# Patient Record
Sex: FEMALE | Race: WHITE | NOT HISPANIC OR LATINO | Employment: FULL TIME | URBAN - METROPOLITAN AREA
[De-identification: names, ages, dates, MRNs, and addresses within clinical notes are randomized per-mention and may not be internally consistent; named-entity substitution may affect disease eponyms.]

---

## 2023-01-01 ENCOUNTER — APPOINTMENT (EMERGENCY)
Dept: RADIOLOGY | Facility: HOSPITAL | Age: 48
End: 2023-01-01

## 2023-01-01 ENCOUNTER — HOSPITAL ENCOUNTER (EMERGENCY)
Facility: HOSPITAL | Age: 48
Discharge: HOME | End: 2023-01-01
Attending: EMERGENCY MEDICINE

## 2023-01-01 ENCOUNTER — APPOINTMENT (EMERGENCY)
Dept: CT IMAGING | Facility: HOSPITAL | Age: 48
End: 2023-01-01

## 2023-01-01 VITALS
RESPIRATION RATE: 18 BRPM | SYSTOLIC BLOOD PRESSURE: 150 MMHG | TEMPERATURE: 97.2 F | DIASTOLIC BLOOD PRESSURE: 85 MMHG | OXYGEN SATURATION: 98 % | HEART RATE: 60 BPM

## 2023-01-01 DIAGNOSIS — S42.032A DISPLACED FRACTURE OF LATERAL END OF LEFT CLAVICLE, INITIAL ENCOUNTER FOR CLOSED FRACTURE: ICD-10-CM
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
## 2023-01-01 DIAGNOSIS — S09.90XA CLOSED HEAD INJURY, INITIAL ENCOUNTER: ICD-10-CM

## 2023-01-01 NOTE — Clinical Note
Shaka Abdullahi was seen and treated in our emergency department on 1/1/2023  Diagnosis:     China Alvares  may return to work on return date  She may return on this date: 01/06/2023         If you have any questions or concerns, please don't hesitate to call        Marcy Saavedra PA-C    ______________________________           _______________          _______________  Hospital Representative                              Date                                Time

## 2023-01-01 NOTE — ED PROVIDER NOTES
History  Chief Complaint   Patient presents with   • Fall     Pt was at work taking out the trash when she fell forward falling and hitting left side of head and left shoulder  Denies loc denies thinners  Small laceration noted to left side of head  Pt c/o left shoulder pain  C-collar placed by ems as precaution  Patient is a 48-year-old female with no significant past medical history presenting to the emergency department for evaluation after a fall  She states that she was taking the trash out at work when she slipped and fell  She landed on the left side of her head as well as her left shoulder  She has a small lump to the left side of her head as well as a small abrasion  She is currently reporting left-sided shoulder pain  Denying any headache, blurry vision, focal weakness, numbness, tingling, facial droop, dysarthria, confusion  Only complaint currently is left shoulder pain  No chest pain, difficulty breathing, abdominal pain, nausea, vomiting, diarrhea  No loss of consciousness  She does not take anticoagulation  No other complaints at this time  None       History reviewed  No pertinent past medical history  History reviewed  No pertinent surgical history  History reviewed  No pertinent family history  I have reviewed and agree with the history as documented  E-Cigarette/Vaping     E-Cigarette/Vaping Substances     Social History     Tobacco Use   • Smoking status: Every Day     Types: Cigarettes   • Smokeless tobacco: Never       Review of Systems   Constitutional: Negative for chills and fever  HENT: Negative for congestion, facial swelling, nosebleeds, sore throat and voice change  Eyes: Negative for pain and redness  Respiratory: Negative for cough, choking, chest tightness, shortness of breath and stridor  Cardiovascular: Negative for chest pain and palpitations  Gastrointestinal: Negative for abdominal pain, diarrhea, nausea and vomiting  Musculoskeletal: Positive for arthralgias  Negative for back pain, myalgias, neck pain and neck stiffness  Skin: Positive for wound  Negative for color change and rash  Neurological: Negative for dizziness, syncope, facial asymmetry, weakness, light-headedness, numbness and headaches  Psychiatric/Behavioral: Negative for confusion and suicidal ideas  All other systems reviewed and are negative  Physical Exam  Physical Exam  Vitals reviewed  Constitutional:       General: She is not in acute distress  Appearance: Normal appearance  She is not ill-appearing, toxic-appearing or diaphoretic  HENT:      Head: Normocephalic  Abrasion (small abrasion to the L side of the forehead) and contusion (small contusion to the L side of the forehead) present  Right Ear: External ear normal       Left Ear: External ear normal       Nose: Nose normal  No congestion or rhinorrhea  Mouth/Throat:      Mouth: Mucous membranes are moist       Pharynx: Oropharynx is clear  No oropharyngeal exudate or posterior oropharyngeal erythema  Eyes:      General: No scleral icterus  Right eye: No discharge  Left eye: No discharge  Extraocular Movements: Extraocular movements intact  Conjunctiva/sclera: Conjunctivae normal    Cardiovascular:      Rate and Rhythm: Normal rate and regular rhythm  Pulses: Normal pulses  Heart sounds: Normal heart sounds  No murmur heard  No friction rub  No gallop  Pulmonary:      Effort: Pulmonary effort is normal  No respiratory distress  Breath sounds: Normal breath sounds  No stridor  No wheezing, rhonchi or rales  Musculoskeletal:      Left shoulder: Tenderness (L AC joint) present  Normal range of motion  Cervical back: Normal range of motion and neck supple  Right lower leg: No edema  Left lower leg: No edema  Skin:     General: Skin is warm and dry  Capillary Refill: Capillary refill takes less than 2 seconds  Neurological:      General: No focal deficit present  Mental Status: She is alert and oriented to person, place, and time  Psychiatric:         Mood and Affect: Mood normal          Behavior: Behavior normal          Vital Signs  ED Triage Vitals [01/01/23 0251]   Temperature Pulse Respirations Blood Pressure SpO2   (!) 97 2 °F (36 2 °C) 62 18 150/85 98 %      Temp Source Heart Rate Source Patient Position - Orthostatic VS BP Location FiO2 (%)   Tympanic Monitor Sitting Right arm --      Pain Score       --           Vitals:    01/01/23 0251 01/01/23 0300   BP: 150/85 150/85   Pulse: 62 60   Patient Position - Orthostatic VS: Sitting Lying         Visual Acuity  Visual Acuity    Flowsheet Row Most Recent Value   L Pupil Size (mm) 3   R Pupil Size (mm) 3          ED Medications  Medications - No data to display    Diagnostic Studies  Results Reviewed     None                 CT head without contrast   Final Result by Sy Cabrera MD (01/01 [id number])      No acute intracranial abnormality  Workstation performed: [id number]         XR shoulder 2+ views LEFT   Final Result by Kaitlynn Melgar MD ([id number])      Acute displaced distal left clavicle fracture      Workstation performed: [id number]                    Procedures  Procedures         ED Course                                             Medical Decision Making  Patient presenting for evaluation of fall from standing while taking out the trash at work  She did hit her head, however denies loss of consciousness  She is having left-sided shoulder pain  Upon arrival, she appears comfortable  She is not in any acute distress  Vital signs are unremarkable  She does have a small contusion to the left side of her head with an overlying abrasion  Bleeding is controlled  She has tenderness over the distal end of her left clavicle as well as the left AC joint  CT head unremarkable    X-ray of the left shoulder revealed a distal clavicle fracture  Patient denies analgesia at this time  She was placed in a sling  She was given instructions to follow-up with orthopedics  Strict return precautions were discussed  She is in stable condition at time of discharge  Closed head injury, initial encounter: acute illness or injury  Displaced fracture of lateral end of left clavicle, initial encounter for closed fracture: acute illness or injury  Fall from standing, initial encounter: acute illness or injury  Amount and/or Complexity of Data Reviewed  Radiology: ordered and independent interpretation performed  Details: Mildly displaced distal clavicle fracture  Disposition  Final diagnoses:   Fall from standing, initial encounter   Closed head injury, initial encounter   Displaced fracture of lateral end of left clavicle, initial encounter for closed fracture     Time reflects when diagnosis was documented in both MDM as applicable and the Disposition within this note     Time User Action Codes Description Comment    1/1/2023  4:30 AM Tam Himanshu Add [D21  XXXA] Fall from standing, initial encounter     1/1/2023  4:30 AM Tam Himanshu Add [S09 90XA] Closed head injury, initial encounter     1/1/2023  4:30 AM Tam Himanshu Add [J30 358D] Displaced fracture of lateral end of left clavicle, initial encounter for closed fracture       ED Disposition     ED Disposition   Discharge    Condition   Stable    Date/Time   Sun Jan 1, 2023  4:30 AM    Comment   Theodor Peabody discharge to home/self care                 Follow-up Information     Follow up With Specialties Details Why Contact Info Additional [address] [facility] [address] Emergency Department Emergency Medicine Go to  If symptoms worsen [address] [facility] [address] [facility] Emergency Department, [address] [place], [place], [address] [facility] [address] [facility] Schedule an appointment as soon as possible for a visit  For follow up [address] [facility]  [place] [address] [phone]  [address], [address] [facility] [address] [address] [place], [place], [address] [facility]          There are no discharge medications for this patient  No discharge procedures on file      PDMP Review     None          ED Provider  Electronically Signed by           Marcy Saavedra PA-C  01/02/23 4055

## 2023-01-03 ENCOUNTER — OFFICE VISIT (OUTPATIENT)
Dept: OBGYN CLINIC | Facility: CLINIC | Age: 48
End: 2023-01-03

## 2023-01-03 VITALS
HEART RATE: 60 BPM | DIASTOLIC BLOOD PRESSURE: 83 MMHG | BODY MASS INDEX: 22.73 KG/M2 | HEIGHT: 68 IN | WEIGHT: 150 LBS | SYSTOLIC BLOOD PRESSURE: 128 MMHG

## 2023-01-03 DIAGNOSIS — S42.032A CLOSED DISPLACED FRACTURE OF ACROMIAL END OF LEFT CLAVICLE, INITIAL ENCOUNTER: Primary | ICD-10-CM

## 2023-01-03 RX ORDER — ACETAMINOPHEN 325 MG/1
650 TABLET ORAL EVERY 6 HOURS PRN
COMMUNITY

## 2023-01-03 NOTE — PROGRESS NOTES
Assessment/Plan:  Assessment/Plan   Diagnoses and all orders for this visit:    Closed displaced fracture of acromial end of left clavicle, initial encounter  -     Ambulatory Referral to Physical Therapy; Future    Other orders  -     acetaminophen (TYLENOL) 325 mg tablet; Take 650 mg by mouth every 6 (six) hours as needed for mild pain       49year-old left hand dominant female with onset left shoulder pain from injury at work on 01/01/2023  Discussed with patient physical exam, imaging studies, impression and plan  CT scan of the head is unremarkable for acute intracranial abnormality  Physical exam of the head noted for abrasion at the left frontal aspect with ecchymosis left sided head and at the left   Periorbital region  Left shoulder noted for ecchymosis at the anterior aspect  She has mild tenderness at the distal clavicle and AC joint  She has range of motion limited to forward flexion 30, abduction 30  She has   4/5 strength abduction, internal and external rotation due to pain  She has normal sensation, biceps reflex, and radial pulse both upper extremities  Clinical impression is that she is symptomatic from acute fracture of the clavicle  I discussed treatment in the form of resting,  stabilization, and gentle range of motion  Surgery is not warranted  She is to continue with arm sling to limited movement of the arm  She is to start formal therapy in 2 weeks with passive range of motion modalities to limit stiffness  She is to take vitamin-D 2000 International Units daily  She may alternate between Tylenol and ibuprofen as needed and may also do icing  She will follow up in 3 weeks at which point we will repeat x-rays left clavicle and she will be re-evaluated  Subjective:   Patient ID: Theodor Peabody is a 49 y o  female    Chief Complaint   Patient presents with   • Left Shoulder - Pain       76-year-old left hand dominant female presents for evaluation left shoulder pain from fall injury at work on 01/01/2023  She was taking out the trash when she tripped and fell landing onto her left side injuring her left shoulder and left side of the head  She had pain of left shoulder described as sudden in onset, generalized to the shoulder, non radiating, severe intensity, worse with moving the arm, associated with limited range of motion, and improved resting  She was taken to emergency room where x-ray evaluation was noted for fracture of the distal clavicle  She has CT of the head done which were unremarkable for acute intracranial abnormality  She was placed in arm sling and advised to follow up with orthopedic care  She has been taking Tylenol to help with pain  Shoulder Pain  This is a new problem  The current episode started in the past 7 days  The problem occurs daily  The problem has been unchanged  Associated symptoms include arthralgias and weakness  Pertinent negatives include no abdominal pain, chest pain, chills, fever, joint swelling, numbness, rash or sore throat  Exacerbated by:   Arm movement  She has tried rest, immobilization, acetaminophen and ice for the symptoms  The treatment provided mild relief  The following portions of the patient's history were reviewed and updated as appropriate: She  has no past medical history on file  She  has no past surgical history on file  Her family history is not on file  She  reports that she has been smoking cigarettes  She has never used smokeless tobacco  No history on file for alcohol use and drug use  She has No Known Allergies       Review of Systems   Constitutional: Negative for chills and fever  HENT: Negative for sore throat  Respiratory: Negative for shortness of breath  Cardiovascular: Negative for chest pain  Gastrointestinal: Negative for abdominal pain  Genitourinary: Negative for flank pain  Musculoskeletal: Positive for arthralgias  Negative for joint swelling     Skin: Negative for rash and wound  Neurological: Positive for weakness  Negative for numbness  Hematological: Does not bruise/bleed easily  Psychiatric/Behavioral: Negative for self-injury  Objective:  Vitals:    01/03/23 1527   BP: 128/83   Pulse: 60   Weight: 68 kg (150 lb)   Height: 5' 7 5" (1 715 m)     Right Hand Exam     Muscle Strength   The patient has normal right wrist strength  Other   Sensation: normal  Pulse: present      Left Hand Exam     Muscle Strength   The patient has normal left wrist strength  Other   Sensation: normal  Pulse: present      Right Elbow Exam     Other   Sensation: normal      Left Elbow Exam     Tenderness   The patient is experiencing tenderness in the lateral epicondyle  Range of Motion   The patient has normal left elbow ROM  Muscle Strength   The patient has normal left elbow strength ( 5/5 flexion and extension)  Other   Sensation: normal      Left Shoulder Exam     Tenderness   The patient is experiencing tenderness in the acromion and acromioclavicular joint ( distal clavicle, anterior, trapezius, lateral)  Range of Motion   Active abduction: 30   Forward flexion: 30     Muscle Strength   Abduction: 4/5   Internal rotation: 4/5   External rotation: 4/5     Other   Sensation: normal     Comments:   Ecchymosis anterior shoulder          Strength/Myotome Testing     Left Wrist/Hand   Normal wrist strength    Right Wrist/Hand   Normal wrist strength      Physical Exam  Vitals and nursing note reviewed  Constitutional:       General: She is not in acute distress  Appearance: She is well-developed  She is not ill-appearing or diaphoretic  HENT:      Head: Normocephalic  Comments:  Left frontal ecchymosis     Right Ear: External ear normal       Left Ear: External ear normal    Eyes:      Conjunctiva/sclera: Conjunctivae normal       Comments:  Left periorbital ecchymosis   Neck:      Trachea: No tracheal deviation     Cardiovascular:      Rate and Rhythm: Normal rate  Pulmonary:      Effort: Pulmonary effort is normal  No respiratory distress  Abdominal:      General: There is no distension  Musculoskeletal:         General: Swelling, tenderness and signs of injury present  No deformity  Skin:     General: Skin is warm and dry  Coloration: Skin is not jaundiced or pale  Neurological:      Mental Status: She is alert and oriented to person, place, and time  Psychiatric:         Mood and Affect: Mood normal          Behavior: Behavior normal          Thought Content: Thought content normal          Judgment: Judgment normal          I have personally reviewed pertinent films in PACS and my interpretation is  mild displaced distal clavicle fracture

## 2023-01-03 NOTE — LETTER
January 3, 2023     Patient: Aakash Peter  YOB: 1975  Date of Visit: 1/3/2023      To Whom it May Concern:    Aakash Peter is under my professional care  Casey Manning was seen in my office on 1/3/2023  Casey Manning is unable to return to work at this time time  She will be re-evaluated in 3 weeks  If you have any questions or concerns, please don't hesitate to call           Sincerely,          Cleo Rodriguez DO        CC: No Recipients

## 2023-01-24 ENCOUNTER — APPOINTMENT (OUTPATIENT)
Dept: RADIOLOGY | Facility: CLINIC | Age: 48
End: 2023-01-24

## 2023-01-24 ENCOUNTER — OFFICE VISIT (OUTPATIENT)
Dept: OBGYN CLINIC | Facility: CLINIC | Age: 48
End: 2023-01-24

## 2023-01-24 VITALS
WEIGHT: 143 LBS | SYSTOLIC BLOOD PRESSURE: 113 MMHG | DIASTOLIC BLOOD PRESSURE: 77 MMHG | HEIGHT: 68 IN | BODY MASS INDEX: 21.67 KG/M2 | HEART RATE: 58 BPM

## 2023-01-24 DIAGNOSIS — S42.032D CLOSED DISPLACED FRACTURE OF ACROMIAL END OF LEFT CLAVICLE WITH ROUTINE HEALING, SUBSEQUENT ENCOUNTER: Primary | ICD-10-CM

## 2023-01-24 DIAGNOSIS — S42.032A CLOSED DISPLACED FRACTURE OF ACROMIAL END OF LEFT CLAVICLE, INITIAL ENCOUNTER: ICD-10-CM

## 2023-01-24 DIAGNOSIS — M25.612 SHOULDER STIFFNESS, LEFT: ICD-10-CM

## 2023-01-24 NOTE — LETTER
January 24, 2023     Patient: Krissy Cooper  YOB: 1975  Date of Visit: 1/24/2023      To Whom it May Concern:    Krissy Cooper is under my professional care  Konrad Polk was seen in my office on 1/24/2023  Konrad Polk may work with restrictions:  -No lifting, pushing, pulling more than 10 pounds in left upper extremity      She will be re-evaluated in 4 weeks  If you have any questions or concerns, please don't hesitate to call           Sincerely,          Cleo Rodriguez DO        CC: No Recipients

## 2023-01-24 NOTE — PROGRESS NOTES
Assessment/Plan:  Assessment/Plan   Diagnoses and all orders for this visit:    Closed displaced fracture of acromial end of left clavicle with routine healing, subsequent encounter  -     XR clavicle left; Future  -     Ambulatory Referral to Physical Therapy; Future    Shoulder stiffness, left  -     Ambulatory Referral to Physical Therapy; Future         58-year-old left hand dominant female with onset of left shoulder pain from injury at work on 01/01/2023  Discussed with patient physical exam, radiographs, impression and plan  X-rays noted for persistence distal clavicle fracture without change in alignment  Left shoulder noted for range of motion forward flexion 90, abduction 90, and internal rotation to the lower thoracic spine  She has normal strength in rotator cuff  Clinical impression is that she is improving  Recommend she continue with formal therapy and doing home exercises  She will follow up in 4 weeks at which point we will repeat x-rays left clavicle and she will be re-evaluated  Subjective:   Patient ID: Delgado Singletary is a 49 y o  female  Chief Complaint   Patient presents with   • Left Shoulder - Fracture, Follow-up         58-year-old left hand dominant female following for onset of left shoulder pain from injury at work on 01/01/2023  She was last seen by me 3 weeks ago which point she was advised on resting from activity and to start formal therapy  She was advised to start  Vitamin-D supplementation  She reports feeling better since her last visit  She has started therapy has been doing home exercises  She reports having pain described as generalized to the shoulder, achy and sore, nonradiating, worse with movement particularly elevating above shoulder level, associated with limited range of motion, and improved  With resting  Shoulder Pain  This is a new problem  The current episode started 1 to 4 weeks ago  The problem occurs daily   The problem has been gradually improving  Associated symptoms include arthralgias  Pertinent negatives include no joint swelling, numbness or weakness  Exacerbated by:  arm elevation  She has tried rest ( physical therapy, home exercise) for the symptoms  The treatment provided mild relief  Review of Systems   Musculoskeletal: Positive for arthralgias  Negative for joint swelling  Neurological: Negative for weakness and numbness  Objective:  Vitals:    01/24/23 1111   BP: 113/77   Pulse: 58   Weight: 64 9 kg (143 lb)   Height: 5' 7 5" (1 715 m)     Left Shoulder Exam     Range of Motion   Active abduction: 90   Forward flexion: 90   Left shoulder internal rotation 0 degrees:  lower thoracic  Muscle Strength   Abduction: 5/5   Internal rotation: 5/5   External rotation: 5/5   Supraspinatus: 5/5             Physical Exam  Vitals and nursing note reviewed  Constitutional:       General: She is not in acute distress  Appearance: She is well-developed  She is not ill-appearing or diaphoretic  HENT:      Head: Normocephalic  Right Ear: External ear normal       Left Ear: External ear normal    Eyes:      Conjunctiva/sclera: Conjunctivae normal    Neck:      Trachea: No tracheal deviation  Cardiovascular:      Rate and Rhythm: Normal rate  Pulmonary:      Effort: Pulmonary effort is normal  No respiratory distress  Abdominal:      General: There is no distension  Skin:     General: Skin is warm and dry  Coloration: Skin is not jaundiced or pale  Neurological:      Mental Status: She is alert and oriented to person, place, and time  Psychiatric:         Mood and Affect: Mood normal          Behavior: Behavior normal          Thought Content: Thought content normal          Judgment: Judgment normal          I have personally reviewed pertinent films in PACS and my interpretation is  X-rays noted for persistence distal clavicle fracture without change in alignment  Ashley Santo

## 2023-02-21 ENCOUNTER — OFFICE VISIT (OUTPATIENT)
Dept: OBGYN CLINIC | Facility: CLINIC | Age: 48
End: 2023-02-21

## 2023-02-21 ENCOUNTER — APPOINTMENT (OUTPATIENT)
Dept: RADIOLOGY | Facility: CLINIC | Age: 48
End: 2023-02-21

## 2023-02-21 VITALS
DIASTOLIC BLOOD PRESSURE: 85 MMHG | BODY MASS INDEX: 22.88 KG/M2 | SYSTOLIC BLOOD PRESSURE: 127 MMHG | HEIGHT: 68 IN | WEIGHT: 151 LBS | HEART RATE: 55 BPM

## 2023-02-21 DIAGNOSIS — S42.032D CLOSED DISPLACED FRACTURE OF ACROMIAL END OF LEFT CLAVICLE WITH ROUTINE HEALING, SUBSEQUENT ENCOUNTER: Primary | ICD-10-CM

## 2023-02-21 DIAGNOSIS — S42.032D CLOSED DISPLACED FRACTURE OF ACROMIAL END OF LEFT CLAVICLE WITH ROUTINE HEALING, SUBSEQUENT ENCOUNTER: ICD-10-CM

## 2023-02-21 NOTE — PROGRESS NOTES
Assessment/Plan:  Assessment/Plan   Diagnoses and all orders for this visit:    Closed displaced fracture of acromial end of left clavicle with routine healing, subsequent encounter  -     XR clavicle left; Future        21-year-old left-hand-dominant female who sustained fracture of left clavicle while at work 1/1/2023  Discussed with patient physical exam, radiographs, impression, and plan  X-rays left shoulder for progressive healing of distal clavicle fracture without change in alignment  Left shoulder noted for mild tenderness of the distal clavicle  She range of motion forward flexion to 170 degrees, abduction 160 degrees, and internal rotation to the lumbar spine  She has normal strength in rotator cuff  Empty can testing and Se[place]ron Hoose are unremarkable  There is mild pain with AC joint and clavicle provocation testing  She demonstrates push-up  Clinical impression is that she is recovering well from her injury  Recommend she increase her level of physical activity over the course of the next few weeks  She will follow-up as needed  Subjective:   Patient ID: Valerio Guaman is a 49 y o  female  Chief Complaint   Patient presents with   • Left Shoulder - Follow-up, Fracture       21-year-old left-hand-dominant female following up for left clavicle fracture sustained from injury while at work on 1/1/2023  She was last seen by me weeks ago at which point she was advised to continue with formal therapy  She reports feeling better since her last visit  She reports having pain described as localized to the superior aspect of the shoulder, achy and sore, mild intense, none radiating, worse with laying on her side for prolonged duration, worse with heavy lifting above her level, and improved with resting  She reports being able to reach above her level with weighted object such moving plates from a shelf  Her new job is less physically demanding  Shoulder Pain  This is a new problem   The current episode started more than 1 month ago  The problem occurs daily  The problem has been gradually improving  Associated symptoms include arthralgias  Pertinent negatives include no joint swelling, numbness or weakness  Exacerbated by: Physical activity  She has tried rest (Physical therapy, home exercise) for the symptoms  The treatment provided moderate relief  Review of Systems   Musculoskeletal: Positive for arthralgias  Negative for joint swelling  Neurological: Negative for weakness and numbness  Objective:  Vitals:    02/21/23 1625   BP: 127/85   Pulse: 55   Weight: 68 5 kg (151 lb)   Height: 5' 7 5" (1 715 m)     Left Shoulder Exam     Tenderness   The patient is experiencing tenderness in the clavicle  Range of Motion   Active abduction: 160   Forward flexion: 170   Internal rotation 0 degrees: Lumbar     Muscle Strength   Abduction: 5/5   Internal rotation: 5/5   External rotation: 5/5   Supraspinatus: 5/5     Tests   Feliz test: negative    Comments:  Negative empty can test  Mild pain with resisted cross arm abduction            Physical Exam  Vitals and nursing note reviewed  Constitutional:       General: She is not in acute distress  Appearance: She is well-developed  She is not ill-appearing or diaphoretic  HENT:      Head: Normocephalic  Right Ear: External ear normal       Left Ear: External ear normal    Eyes:      Conjunctiva/sclera: Conjunctivae normal    Neck:      Trachea: No tracheal deviation  Cardiovascular:      Rate and Rhythm: Normal rate  Pulmonary:      Effort: Pulmonary effort is normal  No respiratory distress  Abdominal:      General: There is no distension  Musculoskeletal:         General: Tenderness present  No swelling, deformity or signs of injury  Skin:     General: Skin is warm and dry  Coloration: Skin is not jaundiced or pale  Neurological:      Mental Status: She is alert and oriented to person, place, and time  Psychiatric:         Mood and Affect: Mood normal          Behavior: Behavior normal          Thought Content: Thought content normal          Judgment: Judgment normal          I have personally reviewed pertinent films in PACS and my interpretation is X-rays left shoulder for progressive healing of distal clavicle fracture without change in alignment  [place]

## 2023-02-21 NOTE — LETTER
February 21, 2023     Patient: Martha Kovacs  YOB: 1975  Date of Visit: 2/21/2023      To Whom it May Concern:    Martha Kovacs is under my professional care  José Manuel Leonard was seen in my office on 2/21/2023  José Manuel Leonard may work with restrictions until 03/07/2023: No lifting, pushing, pulling more than 30 pounds  As of 03/07/2023 she may return to work without restriction  If you have any questions or concerns, please don't hesitate to call           Sincerely,          Cleo Rodriguez DO        CC: No Recipients

## 2025-05-21 ENCOUNTER — OFFICE VISIT (OUTPATIENT)
Age: 50
End: 2025-05-21

## 2025-05-21 VITALS — BODY MASS INDEX: 24.38 KG/M2 | TEMPERATURE: 97.6 F | WEIGHT: 158 LBS

## 2025-05-21 DIAGNOSIS — D48.5 NEOPLASM OF UNCERTAIN BEHAVIOR OF SKIN: Primary | ICD-10-CM

## 2025-05-21 PROCEDURE — 88305 TISSUE EXAM BY PATHOLOGIST: CPT | Performed by: STUDENT IN AN ORGANIZED HEALTH CARE EDUCATION/TRAINING PROGRAM

## 2025-05-21 PROCEDURE — 88342 IMHCHEM/IMCYTCHM 1ST ANTB: CPT | Performed by: STUDENT IN AN ORGANIZED HEALTH CARE EDUCATION/TRAINING PROGRAM

## 2025-05-21 NOTE — PROGRESS NOTES
"[facility] Note     Patient Name: Jennifer Walker  Encounter Date: 5/21/25       Have you been cared for by a [facility] Dermatologist in the last 3 years and, if so, which description applies to you? NO. I am considered a \"new\" patient and must complete all patient intake questions. I am of child-bearing potential.     REVIEW OF SYSTEMS:  Have you recently had or currently have any of the following? Recent fever or chills? No  Any non-healing wound? No  Are you pregnant or planning to become pregnant? No  Are you currently or planning to be nursing or breast feeding? No   PAST MEDICAL HISTORY:  Have you personally ever had or currently have any of the following?  If \"YES,\" then please provide more detail. Skin cancer (such as Melanoma, Basal Cell Carcinoma, Squamous Cell Carcinoma?  No  Tuberculosis, HIV/AIDS, Hepatitis B or C: No  Radiation Treatment No   HISTORY OF IMMUNOSUPPRESSION:   Do you have a history of any of the following:  Systemic Immunosuppression such as Diabetes, Biologic or Immunotherapy, Chemotherapy, Organ Transplantation, Bone Marrow Transplantation or Prednsione?  No    Answering \"YES\" requires the addition of the dotphrase \"IMMUNOSUPPRESSED\" as the first diagnosis of the patient's visit.   FAMILY HISTORY:  Any \"first degree relatives\" (parent, brother, sister, or child) with the following?    Skin Cancer, Pancreatic or Other Cancer? No   PATIENT EXPERIENCE:    Do you want the Dermatologist to perform a COMPLETE skin exam today including a clinical examination under the \"bra and underwear\" areas?  NO  If necessary, do we have your permission to call and leave a detailed message on your Preferred Phone number that includes your specific medical information?  Yes      Allergies[1] Current Medications[2]        Whom besides the patient is providing clinical information about today's encounter?   NO ADDITIONAL HISTORIAN (patient alone provided history)    Physical Exam and " "Assessment/Plan by Diagnosis:      NEOPLASM OF UNCERTAIN BEHAVIOR OF SKIN    Physical Exam:  (Anatomic Location); (Size and Morphological Description); (Differential Diagnosis):  A: left cheek; 0.5 cm pink papule; BCC vs. IDN r/o atypia  Pertinent Positives:  Pertinent Negatives:        Additional History of Present Condition:  Patient presents for a spot of concern that has been present since the past month. She states that it has become darker in color. She states that it feels raised. She reports that she may have had cryotherapy treatment done to this area about 5 years ago but not sure what it was for.     Assessment and Plan:  I have discussed with the patient that a sample of skin via a \"skin biopsy” would be potentially helpful to further make a specific diagnosis under the microscope.  Based on a thorough discussion of this condition and the management approach to it (including a comprehensive discussion of the known risks, side effects and potential benefits of treatment), the patient (family) agrees to implement the following specific plan:    Procedure:  Skin Biopsy.  After a thorough discussion of treatment options and risk/benefits/alternatives (including but not limited to local pain, scarring, dyspigmentation, blistering, possible superinfection, and inability to confirm a diagnosis via histopathology), verbal and written consent were obtained and portion of the rash was biopsied for tissue sample.  See below for consent that was obtained from patient and subsequent Procedure Note.     PROCEDURE TANGENTIAL (SHAVE) BIOPSY NOTE:    Performing Physician: Dr. Toure  Anatomic Location; Clinical Description with size (cm); Pre-Op Diagnosis:   A: left cheek; 0.5 cm pink papule; BCC vs. IDN r/o atypia  Post-op diagnosis: Same     Local anesthesia: 1% xylocaine with epi      Topical anesthesia: None    Hemostasis: Aluminum chloride     After obtaining informed consent  at which time there was a " "discussion about the purpose of biopsy  and low risks of infection and bleeding.  The area was prepped and draped in the usual fashion. Anesthesia was obtained with 1% lidocaine with epinephrine. A shave biopsy to an appropriate sampling depth was obtained by Shave (Dermablade or 15 blade) The resulting wound was covered with surgical ointment and bandaged appropriately.     The patient tolerated the procedure well without complications and was without signs of functional compromise.      Specimen has been sent for review by Dermatopathology.    Standard post-procedure care has been explained and has been included in written form within the patient's copy of Informed Consent.    INFORMED CONSENT DISCUSSION AND POST-OPERATIVE INSTRUCTIONS FOR PATIENT    I.  RATIONALE FOR PROCEDURE  I understand that a skin biopsy allows the Dermatologist to test a lesion or rash under the microscope to obtain a diagnosis.  It usually involves numbing the area with numbing medication and removing a small piece of skin; sometimes the area will be closed with sutures. In this specific procedure, sutures are not usually needed.  If any sutures are placed, then they are usually need to be removed in 2 weeks or less.    I understand that my Dermatologist recommends that a skin \"shave\" biopsy be performed today.  A local anesthetic, similar to the kind that a dentist uses when filling a cavity, will be injected with a very small needle into the skin area to be sampled.  The injected skin and tissue underneath \"will go to sleep” and become numb so no pain should be felt afterwards.  An instrument shaped like a tiny \"razor blade\" (shave biopsy instrument) will be used to cut a small piece of tissue and skin from the area so that a sample of tissue can be taken and examined more closely under the microscope.  A slight amount of bleeding will occur, but it will be stopped with direct pressure and a pressure bandage and any other appropriate " "methods.  I understands that a scar will form where the wound was created.  Surgical ointment will be applied to help protect the wound.  Sutures are not usually needed.    II.  RISKS AND POTENTIAL COMPLICATIONS   I understand the risks and potential complications of a skin biopsy include but are not limited to the following:  Bleeding  Infection  Pain  Scar/keloid  Skin discoloration  Incomplete Removal  Recurrence  Nerve Damage/Numbness/Loss of Function  Allergic Reaction to Anesthesia  Biopsies are diagnostic procedures and based on findings additional treatment or evaluation may be required  Loss or destruction of specimen resulting in no additional findings    My Dermatologist has explained to me the nature of the condition, the nature of the procedure, and the benefits to be reasonably expected compared with alternative approaches.  My Dermatologist has discussed the likelihood of major risks or complications of this procedure including the specific risks listed above, such as bleeding, infection, and scarring/keloid.  I understand that a scar is expected after this procedure.  I understand that my physician cannot predict if the scar will form a \"keloid,\" which extends beyond the borders of the wound that is created.  A keloid is a thick, painful, and bumpy scar.  A keloid can be difficult to treat, as it does not always respond well to therapy, which includes injecting cortisone directly into the keloid every few weeks.  While this usually reduces the pain and size of the scar, it does not eliminate it.      I understand that photographs may be taken before and after the procedure.  These will be maintained as part of the medical providers confidential records and may not be made available to me.  I further authorize the medical provider to use the photographs for teaching purposes or to illustrate scientific papers, books, or lectures if in his/her judgment, medical research, education, or science may " "benefit from its use.    I have had an opportunity to fully inquire about the risks and benefits of this procedure and its alternatives.   I have been given ample time and opportunity to ask questions and to seek a second opinion if I wished to do so.  I acknowledge that there have specifically been no guarantees as to the cosmetic results from the procedure.  I am aware that with any procedure there is always the possibility of an unexpected complication.    III. POST-PROCEDURAL CARE (WHAT YOU WILL NEED TO DO \"AFTER THE BIOPSY\" TO OPTIMIZE HEALING)    Keep the area clean and dry.  Try NOT to remove the bandage or get it wet for the first 24 hours.    Gently clean the area and apply surgical ointment (such as Vaseline petrolatum ointment, which is available \"over the counter\" and not a prescription) to the biopsy site for up to 2 weeks straight.  This acts to protect the wound from the outside world.      Sutures are not usually placed in this procedure.  If any sutures were placed, return for suture removal as instructed (generally 1 week for the face, 2 weeks for the body).      Take Acetaminophen (Tylenol) for discomfort, if no contraindications.  Ibuprofen or aspirin could make bleeding worse.    Call our office immediately for signs of infection: fever, chills, increased redness, warmth, tenderness, discomfort/pain, or pus or foul smell coming from the wound.    WHAT TO DO IF THERE IS ANY BLEEDING?  If a small amount of bleeding is noticed, place a clean cloth over the area and apply firm pressure for ten minutes.  Check the wound after 10 minutes of direct pressure.  If bleeding persists, try one more time for an additional 10 minutes of direct pressure on the area.  If the bleeding becomes heavier or does not stop after the second attempt, or if you have any other questions about this procedure, then please call your . [facility] by calling [phone] (SKIN).     I hereby acknowledge that I have " reviewed and verified the site with my Dermatologist and have requested and authorized my Dermatologist to proceed with the procedure.        Scribe Attestation      I,:  Shereen Eckert MA am acting as a scribe while in the presence of the attending physician.:       I,:  Lavelle Toure MD personally performed the services described in this documentation    as scribed in my presence.:                [1] No Known Allergies  [2]   Current Outpatient Medications:     acetaminophen (TYLENOL) 325 mg tablet, Take 650 mg by mouth every 6 (six) hours as needed for mild pain (Patient not taking: Reported on 2/21/2023), Disp: , Rfl:

## 2025-05-27 PROCEDURE — 88342 IMHCHEM/IMCYTCHM 1ST ANTB: CPT | Performed by: STUDENT IN AN ORGANIZED HEALTH CARE EDUCATION/TRAINING PROGRAM

## 2025-05-27 PROCEDURE — 88305 TISSUE EXAM BY PATHOLOGIST: CPT | Performed by: STUDENT IN AN ORGANIZED HEALTH CARE EDUCATION/TRAINING PROGRAM

## 2025-05-28 ENCOUNTER — RESULTS FOLLOW-UP (OUTPATIENT)
Age: 50
End: 2025-05-28

## 2025-07-16 ENCOUNTER — HOSPITAL ENCOUNTER (EMERGENCY)
Facility: HOSPITAL | Age: 50
Discharge: HOME | End: 2025-07-16
Attending: EMERGENCY MEDICINE
Payer: COMMERCIAL

## 2025-07-16 VITALS
WEIGHT: 157.85 LBS | HEART RATE: 75 BPM | SYSTOLIC BLOOD PRESSURE: 145 MMHG | BODY MASS INDEX: 23.92 KG/M2 | HEIGHT: 68 IN | TEMPERATURE: 98.2 F | DIASTOLIC BLOOD PRESSURE: 78 MMHG | OXYGEN SATURATION: 100 % | RESPIRATION RATE: 18 BRPM

## 2025-07-16 DIAGNOSIS — A69.29 LYME DERMATITIS: Primary | ICD-10-CM

## 2025-07-16 DIAGNOSIS — H57.11 EYE PAIN, RIGHT: ICD-10-CM

## 2025-07-16 DIAGNOSIS — L30.8 LYME DERMATITIS: Primary | ICD-10-CM

## 2025-07-16 PROCEDURE — 99283 EMERGENCY DEPT VISIT LOW MDM: CPT

## 2025-07-16 PROCEDURE — 86617 LYME DISEASE ANTIBODY: CPT | Performed by: EMERGENCY MEDICINE

## 2025-07-16 PROCEDURE — 36415 COLL VENOUS BLD VENIPUNCTURE: CPT | Performed by: EMERGENCY MEDICINE

## 2025-07-16 PROCEDURE — 99284 EMERGENCY DEPT VISIT MOD MDM: CPT | Performed by: EMERGENCY MEDICINE

## 2025-07-16 PROCEDURE — 86618 LYME DISEASE ANTIBODY: CPT | Performed by: EMERGENCY MEDICINE

## 2025-07-16 PROCEDURE — 87469 BABESIA MICROTI AMP PRB: CPT | Performed by: EMERGENCY MEDICINE

## 2025-07-16 PROCEDURE — 87468 ANAPLSMA PHGCYTOPHLM AMP PRB: CPT | Performed by: EMERGENCY MEDICINE

## 2025-07-16 PROCEDURE — 87478 BORRELIA MIYAMOTOI AMP PRB: CPT | Performed by: EMERGENCY MEDICINE

## 2025-07-16 PROCEDURE — 87484 EHRLICHA CHAFFEENSIS AMP PRB: CPT | Performed by: EMERGENCY MEDICINE

## 2025-07-16 RX ORDER — FAMCICLOVIR 500 MG/1
500 TABLET ORAL 3 TIMES DAILY
Qty: 21 TABLET | Refills: 0 | Status: SHIPPED | OUTPATIENT
Start: 2025-07-16 | End: 2025-07-16

## 2025-07-16 RX ORDER — DOXYCYCLINE 100 MG/1
100 CAPSULE ORAL ONCE
Status: COMPLETED | OUTPATIENT
Start: 2025-07-16 | End: 2025-07-16

## 2025-07-16 RX ORDER — DOXYCYCLINE HYCLATE 100 MG
100 TABLET ORAL 2 TIMES DAILY
Qty: 42 TABLET | Refills: 0 | Status: SHIPPED | OUTPATIENT
Start: 2025-07-16 | End: 2025-07-17

## 2025-07-16 RX ORDER — TOBRAMYCIN 3 MG/ML
1 SOLUTION/ DROPS OPHTHALMIC ONCE
Status: COMPLETED | OUTPATIENT
Start: 2025-07-16 | End: 2025-07-16

## 2025-07-16 RX ORDER — TETRACAINE HYDROCHLORIDE 5 MG/ML
1 SOLUTION OPHTHALMIC ONCE
Status: DISCONTINUED | OUTPATIENT
Start: 2025-07-16 | End: 2025-07-16

## 2025-07-16 RX ORDER — ACYCLOVIR 200 MG/1
800 CAPSULE ORAL ONCE
Status: DISCONTINUED | OUTPATIENT
Start: 2025-07-16 | End: 2025-07-16 | Stop reason: HOSPADM

## 2025-07-16 RX ADMIN — FLUORESCEIN SODIUM 1 STRIP: 1 STRIP OPHTHALMIC at 18:22

## 2025-07-16 RX ADMIN — DOXYCYCLINE HYCLATE 100 MG: 100 CAPSULE ORAL at 19:01

## 2025-07-16 RX ADMIN — TOBRAMYCIN 1 DROP: 3 SOLUTION/ DROPS OPHTHALMIC at 18:22

## 2025-07-16 NOTE — DISCHARGE INSTRUCTIONS
Doxycycline twice daily to fight infection  Follow-up with [facility], you can go to the office tomorrow and they will see you.  Return worsening symptoms or any problems

## 2025-07-16 NOTE — ED PROVIDER NOTES
Time reflects when diagnosis was documented in both MDM as applicable and the Disposition within this note       Time User Action Codes Description Comment    7/16/2025  6:22 PM Andrea Tang Add [H57.11] Eye pain, right     7/16/2025  6:22 PM Andrea Tang Add [B02.9] Shingles     7/16/2025  6:29 PM Andrea Tang Modify [B02.9] Shingles     7/16/2025  6:29 PM Andrea Tang Remove [H57.11] Eye pain, right     7/16/2025  6:29 PM Andrea Tang Remove [B02.9] Shingles     7/16/2025  6:29 PM Andrea Tang Add [A69.29,  L30.8] Lyme dermatitis     7/16/2025  6:30 PM Andrea Tang Add [H57.11] Eye pain, right           ED Disposition       ED Disposition   Discharge    Condition   Stable    Date/Time   Wed Jul 16, 2025  6:22 PM    Comment   Jennifer Walker discharge to home/self care.                   Assessment & Plan       Medical Decision Making  Amount and/or Complexity of Data Reviewed  Labs: ordered.    Risk  Prescription drug management.      Medical decision making 50-year-old female  presents to the emergency department recently had a rash treated with Valtrex for what was presumed to be shingles.  Now reports the rash was improving.  Initially the patient did not want me to look at the rash and I evaluated her eyes but complained of more diffuse systemic aches and pains so I evaluated her rash and it appears to be the rest of Lyme's.  There is a large red circular patch greater than 5 cm with central clearing.  Patient has pictures of her phone the initial rash did show some pustular like lesions 3-4 and a red patch but now the rash appears to be more classic for erythema migrans.  Discussed treatment with doxycycline with the patient.  Discussed with her for persistent eye complaints I would advise her to follow-up with ophthalmology but her vision is normal tonight.  There is no sign of dendrites on her cornea and no sign of involvement of shingles on her eye.  I do not believe the patient had shingles at  all I believe she is has erythema migrans and should treated be treated for Lyme's.  Patient agreed and understood.       Medications   fluorescein sodium sterile ophthalmic strip 1 strip (1 strip Both Eyes Given 7/16/25 1822)   tobramycin (TOBREX) 0.3 % ophthalmic solution 1 drop (1 drop Right Eye Given 7/16/25 1822)   doxycycline hyclate (VIBRAMYCIN) capsule 100 mg (100 mg Oral Given 7/16/25 1901)       ED Risk Strat Scores                    No data recorded        SBIRT 20yo+      Flowsheet Row Most Recent Value   Initial Alcohol Screen: US AUDIT-C     1. How often do you have a drink containing alcohol? 0 Filed at: 07/16/2025 1849   2. How many drinks containing alcohol do you have on a typical day you are drinking?  0 Filed at: 07/16/2025 1849   3b. FEMALE Any Age, or MALE 65+: How often do you have 4 or more drinks on one occassion? 0 Filed at: 07/16/2025 1849   Audit-C Score 0 Filed at: 07/16/2025 1849   ELO: How many times in the past year have you...    Used an illegal drug or used a prescription medication for non-medical reasons? Never Filed at: 07/16/2025 1849                            History of Present Illness       Chief Complaint   Patient presents with    Eye Pain     Pt states R eye pain starting on Sunday, states having shingles last week and unsure if its related or not        Past Medical History[1]   Past Surgical History[2]   Family History[3]   Social History[4]   E-Cigarette/Vaping      E-Cigarette/Vaping Substances      I have reviewed and agree with the history as documented.     HPI patient is a 50-year-old female seen recently at as an outpatient for a rash that started on her left thigh.  Patient has pictures on her phone's of a circular rash with some small vesicular lesions appear more pustular to me on her phone.  She reports she was told this was probably early shingles.  She was treated with valacyclovir twice daily for this and reports some improvement.  She reports the rash  seems to be improved but now developed some irritation of her eyes.  She reports irritation primarily on the lateral aspect of her right eye and some dryness.  She was concerned that the shingles virus had gotten into her eyes.  She reports some generalized headache and eye throbbing which is primarily bilateral.  She reports some soreness and neck stiffness.  She reports some diffuse bodyaches.  She denies any fever.  She denies any shaking chills.  Patient denies any trauma.  She denies any visual disturbance.  Visual acuity is normal by our evaluation 20/20.  Denies any trauma.  Review of Systems   Constitutional:  Negative for fever.   HENT:  Negative for congestion.    Eyes:  Positive for pain and redness.   Respiratory:  Negative for cough and shortness of breath.    Cardiovascular:  Negative for chest pain.   Gastrointestinal:  Negative for abdominal pain and vomiting.   Skin:  Positive for rash.           Objective       ED Triage Vitals [07/16/25 1705]   Temperature Pulse Blood Pressure Respirations SpO2 Patient Position - Orthostatic VS   98.2 °F (36.8 °C) 75 145/78 18 100 % --      Temp Source Heart Rate Source BP Location FiO2 (%) Pain Score    Temporal -- Left arm -- --      Vitals      Date and Time Temp Pulse SpO2 Resp BP Pain Score FACES Pain Rating User   07/16/25 1705 98.2 °F (36.8 °C) 75 100 % 18 145/78 -- -- MR            Physical Exam  Vitals and nursing note reviewed.   Constitutional:       Appearance: She is well-developed.   HENT:      Head: Normocephalic.      Right Ear: External ear normal.      Left Ear: External ear normal.      Nose: Nose normal.      Mouth/Throat:      Mouth: Mucous membranes are moist.      Pharynx: Oropharynx is clear.     Eyes:      General: Lids are normal.      Extraocular Movements: Extraocular movements intact.      Conjunctiva/sclera: Conjunctivae normal.      Pupils: Pupils are equal, round, and reactive to light.      Comments: No uptake with fluorescein,  pupils equal and reactive, normal funduscopic exam.  Normal anterior chamber, normal pressure to palpation     Cardiovascular:      Rate and Rhythm: Normal rate and regular rhythm.   Pulmonary:      Effort: Pulmonary effort is normal. No respiratory distress.     Musculoskeletal:         General: No deformity. Normal range of motion.      Cervical back: Normal range of motion and neck supple.     Skin:     General: Skin is warm and dry.      Comments: Patient has a red inflamed rash on her left anterior thigh with central clearing consistent with erythema migrans.  No drainable abscess.     Neurological:      Mental Status: She is alert and oriented to person, place, and time.     Psychiatric:         Mood and Affect: Mood normal.         Results Reviewed       Procedure Component Value Units Date/Time    LYME TOTAL AB W REFLEX TO IGM/IGG [[id number]] Collected: 07/16/25 1857    Lab Status: In process Specimen: Blood from Arm, Right Updated: 07/16/25 1905    Narrative:      The following orders were created for panel order LYME TOTAL AB W REFLEX TO IGM/IGG.  Procedure                               Abnormality         Status                     ---------                               -----------         ------                     Lyme Total AB W Reflex t...[[id number]]                      In process                   Please view results for these tests on the individual orders.    Lyme Total AB W Reflex to IGM/IGG [[id number]] Collected: 07/16/25 1857    Lab Status: In process Specimen: Blood from Arm, Right Updated: 07/16/25 1905    Tick-borne Disease, Acute Molecular Panel, Non-Lyme [[id number]] Collected: 07/16/25 1857    Lab Status: In process Specimen: Blood from Arm, Right Updated: 07/16/25 1905            No orders to display       Procedures    ED Medication and Procedure Management   Prior to Admission Medications   Prescriptions Last Dose Informant Patient Reported? Taking?   acetaminophen (TYLENOL) 325 mg  tablet  Self Yes No   Sig: Take 650 mg by mouth every 6 (six) hours as needed for mild pain   Patient not taking: Reported on 2/21/2023      Facility-Administered Medications: None     Discharge Medication List as of 7/16/2025  6:38 PM        START taking these medications    Details   doxycycline hyclate (VIBRA-TABS) 100 mg tablet Take 1 tablet (100 mg total) by mouth 2 (two) times a day for 21 days, Starting Wed 7/16/2025, Until Wed 8/6/2025, Print           CONTINUE these medications which have NOT CHANGED    Details   acetaminophen (TYLENOL) 325 mg tablet Take 650 mg by mouth every 6 (six) hours as needed for mild pain, Historical Med           No discharge procedures on file.  ED SEPSIS DOCUMENTATION   Time reflects when diagnosis was documented in both MDM as applicable and the Disposition within this note       Time User Action Codes Description Comment    7/16/2025  6:22 PM Andrea Tang [H57.11] Eye pain, right     7/16/2025  6:22 PM Andrea Tang [B02.9] Shingles     7/16/2025  6:29 PM Andrea Tang Modify [B02.9] Shingles     7/16/2025  6:29 PM Andrea Tang Remove [H57.11] Eye pain, right     7/16/2025  6:29 PM Andrea Tang Remove [B02.9] Shingles     7/16/2025  6:29 PM Andrea Tang [A69.29,  L30.8] Lyme dermatitis     7/16/2025  6:30 PM Andrea Tang [H57.11] Eye pain, right                      [1] No past medical history on file.  [2] No past surgical history on file.  [3] No family history on file.  [4]   Social History  Tobacco Use    Smoking status: Every Day     Types: Cigarettes    Smokeless tobacco: Never        Andrea Tang MD  07/17/25 0039

## 2025-07-17 LAB
B BURGDOR IGG SERPL QL IA: POSITIVE
B BURGDOR IGG+IGM SER QL IA: POSITIVE
B BURGDOR IGM SERPL QL IA: POSITIVE

## 2025-07-17 RX ORDER — DOXYCYCLINE HYCLATE 100 MG
100 TABLET ORAL 2 TIMES DAILY
Qty: 42 TABLET | Refills: 0 | Status: SHIPPED | OUTPATIENT
Start: 2025-07-17 | End: 2025-08-07

## 2025-07-20 LAB
A PHAGOCYTOPH DNA BLD QL NAA+PROBE: NOT DETECTED
B MICROTI DNA BLD QL NAA+PROBE: NOT DETECTED
B MIYAMOTOI FLAB SPEC QL NAA+PROBE: NOT DETECTED
E CHAFFEENSIS DNA BLD QL NAA+PROBE: NOT DETECTED

## 2025-07-21 NOTE — ED RE-EVALUATION NOTE
Emailed the patient.Communicated with her. Understands test results and will finish her antibiotics.     Andrea Tang MD  07/21/25 9992